# Patient Record
Sex: FEMALE | Race: OTHER | HISPANIC OR LATINO | Employment: UNEMPLOYED | URBAN - METROPOLITAN AREA
[De-identification: names, ages, dates, MRNs, and addresses within clinical notes are randomized per-mention and may not be internally consistent; named-entity substitution may affect disease eponyms.]

---

## 2019-12-25 ENCOUNTER — APPOINTMENT (EMERGENCY)
Dept: RADIOLOGY | Facility: HOSPITAL | Age: 24
End: 2019-12-25

## 2019-12-25 ENCOUNTER — HOSPITAL ENCOUNTER (EMERGENCY)
Facility: HOSPITAL | Age: 24
Discharge: HOME | End: 2019-12-25
Attending: EMERGENCY MEDICINE | Admitting: EMERGENCY MEDICINE

## 2019-12-25 VITALS
RESPIRATION RATE: 16 BRPM | DIASTOLIC BLOOD PRESSURE: 65 MMHG | SYSTOLIC BLOOD PRESSURE: 123 MMHG | WEIGHT: 190 LBS | TEMPERATURE: 98.1 F | OXYGEN SATURATION: 100 % | HEART RATE: 103 BPM

## 2019-12-25 DIAGNOSIS — S93.601A SPRAIN OF RIGHT FOOT, INITIAL ENCOUNTER: ICD-10-CM

## 2019-12-25 DIAGNOSIS — S93.401A RIGHT ANKLE SPRAIN: Primary | ICD-10-CM

## 2019-12-25 PROCEDURE — 99283 EMERGENCY DEPT VISIT LOW MDM: CPT

## 2019-12-25 PROCEDURE — 99284 EMERGENCY DEPT VISIT MOD MDM: CPT | Performed by: PHYSICIAN ASSISTANT

## 2019-12-25 PROCEDURE — 73610 X-RAY EXAM OF ANKLE: CPT

## 2019-12-25 PROCEDURE — 73630 X-RAY EXAM OF FOOT: CPT

## 2019-12-25 RX ORDER — IBUPROFEN 600 MG/1
600 TABLET ORAL ONCE
Status: COMPLETED | OUTPATIENT
Start: 2019-12-25 | End: 2019-12-25

## 2019-12-25 RX ORDER — NAPROXEN 500 MG/1
500 TABLET ORAL 2 TIMES DAILY WITH MEALS
Qty: 12 TABLET | Refills: 0 | Status: SHIPPED | OUTPATIENT
Start: 2019-12-25

## 2019-12-25 RX ADMIN — IBUPROFEN 600 MG: 600 TABLET ORAL at 13:34

## 2019-12-25 NOTE — ED PROVIDER NOTES
History  Chief Complaint   Patient presents with    Ankle Pain     pt c/o right ankle pain after she fell and roll down a hill, not sure if hit her head and or LOC     Patient is a 26-year-old female with no significant past medical history who presents accompanied by family members for evaluation of right foot and ankle pain  She states that she was running after her brother this morning at 4:00 a m  When she tripped down a Hill and twisted her right ankle and foot  She states that she has been limping and has pain worse with weight-bearing  She has not taken any medications or applied any ice  There is associated swelling and bruising noted  She denies any history of fracture  She denies other injury during the fall  She denies fever, chills, nausea vomiting diarrhea, chest pain, shortness breath, abdominal pain, numbness or weakness  None       History reviewed  No pertinent past medical history  History reviewed  No pertinent surgical history  History reviewed  No pertinent family history  I have reviewed and agree with the history as documented  Social History     Tobacco Use    Smoking status: Current Every Day Smoker    Smokeless tobacco: Never Used   Substance Use Topics    Alcohol use: Yes     Comment: occasionally    Drug use: Yes     Types: Marijuana        Review of Systems   Constitutional: Negative for chills and fever  Respiratory: Negative for shortness of breath  Cardiovascular: Negative for chest pain  Gastrointestinal: Negative for abdominal pain, diarrhea, nausea and vomiting  Musculoskeletal: Positive for arthralgias, gait problem and joint swelling  Skin: Negative for color change, rash and wound  Neurological: Negative for weakness and numbness  All other systems reviewed and are negative  Physical Exam  Physical Exam   Constitutional: She is oriented to person, place, and time  She appears well-developed and well-nourished  No distress  HENT:   Head: Normocephalic and atraumatic  Right Ear: External ear normal    Left Ear: External ear normal    Nose: Nose normal    Eyes: EOM are normal    Neck: Normal range of motion  Cardiovascular: Normal rate, regular rhythm and normal heart sounds  Exam reveals no gallop and no friction rub  No murmur heard  Pulmonary/Chest: Effort normal and breath sounds normal  No stridor  No respiratory distress  She has no wheezes  Abdominal: Soft  Bowel sounds are normal  She exhibits no distension  There is no tenderness  There is no guarding  Musculoskeletal:        Right ankle: She exhibits normal range of motion, no swelling, no ecchymosis, no deformity, no laceration and normal pulse  Tenderness  AITFL tenderness found  No lateral malleolus, no medial malleolus, no CF ligament, no posterior TFL, no head of 5th metatarsal and no proximal fibula tenderness found  Achilles tendon normal         Right foot: There is decreased range of motion, tenderness, bony tenderness and swelling  There is normal capillary refill, no crepitus, no deformity and no laceration  Tenderness to palpation of the dorsal aspect of the right foot  No deformity noted  Minimal swelling and bruising  Pain with range of motion of the right foot and ankle  Neurovascular intact distally  Pedal pulses intact  Capillary refill intact  Neurological: She is alert and oriented to person, place, and time  Skin: Skin is warm and dry  Capillary refill takes less than 2 seconds  No rash noted  She is not diaphoretic  No erythema  Psychiatric: She has a normal mood and affect  Her behavior is normal    Nursing note and vitals reviewed        Vital Signs  ED Triage Vitals [12/25/19 1303]   Temperature Pulse Respirations Blood Pressure SpO2   98 1 °F (36 7 °C) 103 16 123/65 100 %      Temp Source Heart Rate Source Patient Position - Orthostatic VS BP Location FiO2 (%)   Temporal Monitor Sitting Left arm --      Pain Score       Worst Possible Pain           Vitals:    12/25/19 1303   BP: 123/65   Pulse: 103   Patient Position - Orthostatic VS: Sitting         Visual Acuity      ED Medications  Medications   ibuprofen (MOTRIN) tablet 600 mg (600 mg Oral Given 12/25/19 1334)       Diagnostic Studies  Results Reviewed     None                 XR foot 3+ views RIGHT   Final Result by Mireya Mark MD (12/25 1400)      No acute osseous abnormality in the right ankle or right foot  Workstation performed: [id number]         XR ankle 3+ views RIGHT   Final Result by Mireya Mark MD (12/25 1400)      No acute osseous abnormality in the right ankle or right foot  Workstation performed: [id number]                    Procedures  Procedures         ED Course                               MDM  Number of Diagnoses or Management Options  Right ankle sprain:   Sprain of right foot, initial encounter:   Diagnosis management comments: Plan will x-ray the right foot and ankle to rule out bony abnormality  Will give motrin and ice here  X-rays with no acute fracture or bony abnormality  Discussed results with patient  Discussed foot and ankle sprain and contusion with patient and family  Ace wrap and postop shoe applied  Given crutches  Discussed weight bear as tolerated  Supportive care including rest, ice, elevation and Tylenol or Motrin for pain  Follow up with family doctor  Also given contact information for orthopedics to schedule appointment if needed  Return to the ED if symptoms worsen or new symptoms arise  Patient states understanding and agrees with plan         Amount and/or Complexity of Data Reviewed  Tests in the radiology section of CPT®: ordered and reviewed  Independent visualization of images, tracings, or specimens: yes    Patient Progress  Patient progress: stable        Disposition  Final diagnoses:   Right ankle sprain   Sprain of right foot, initial encounter     Time reflects when diagnosis was documented in both MDM as applicable and the Disposition within this note     Time User Action Codes Description Comment    12/25/2019  2:08 PM Lazara New Add [N61 210D] Right ankle sprain     12/25/2019  2:08 PM Lazara New Add [I82 425B] Sprain of right foot, initial encounter       ED Disposition     ED Disposition Condition Date/Time Comment    Discharge Stable Wed Dec 25, 2019  2:07 PM Aubree Valencia discharge to home/self care  Follow-up Information     Follow up With Specialties Details Why Contact Info Additional Information        Follow up with your family doctor in 1-2 days     [address] Orthopedic Surgery Schedule an appointment as soon as possible for a visit  As needed [address]  [id number]  [address] [facility], [address] [place] [address], [address] [place] [address], [id number]   801 [facility] Emergency Department Emergency Medicine  If symptoms worsen [facility] [id number] [phone] [place] [facility], [address] , [place], [place], [address]          Discharge Medication List as of 12/25/2019  2:09 PM      START taking these medications    Details   naproxen (EC NAPROSYN) 500 MG EC tablet Take 1 tablet (500 mg total) by mouth 2 (two) times a day with meals, Starting Wed 12/25/2019, Print           No discharge procedures on file      ED Provider  Electronically Signed by           Tayo Arriaza PA-C  12/25/19 2007